# Patient Record
Sex: MALE | Race: AMERICAN INDIAN OR ALASKA NATIVE
[De-identification: names, ages, dates, MRNs, and addresses within clinical notes are randomized per-mention and may not be internally consistent; named-entity substitution may affect disease eponyms.]

---

## 2022-08-29 ENCOUNTER — HOSPITAL ENCOUNTER (EMERGENCY)
Dept: HOSPITAL 5 - ED | Age: 27
Discharge: HOME | End: 2022-08-29
Payer: COMMERCIAL

## 2022-08-29 VITALS — DIASTOLIC BLOOD PRESSURE: 83 MMHG | SYSTOLIC BLOOD PRESSURE: 152 MMHG

## 2022-08-29 DIAGNOSIS — Z20.822: ICD-10-CM

## 2022-08-29 DIAGNOSIS — J45.909: Primary | ICD-10-CM

## 2022-08-29 PROCEDURE — 99282 EMERGENCY DEPT VISIT SF MDM: CPT

## 2022-08-29 NOTE — EMERGENCY DEPARTMENT REPORT
ED Shortness of Breath HPI





- General


Chief Complaint: Dyspnea/Respdistress


Stated Complaint: ASTHMA ATTACK LOWER BACK PAIN


Time Seen by Provider: 08/29/22 10:49


Source: patient


Mode of arrival: Ambulatory


Limitations: No Limitations





- History of Present Illness


Initial Comments: 


26-year-old male history of asthma presents to the emergency department with 

asthma exacerbation.  Patient reports has been having coughing, wheezing, body 

aches she is ongoing for the past 2 days.  Also reports that his infant tested 

positive for COVID as well.  He is out of his albuterol.  Symptoms associated 

with shortness of breath, he denies headache, no vision changes, no nausea or 

vomiting, no fever, no swelling of his extremities,








- Related Data


Home Oxygen Therapy: No


                                  Previous Rx's











 Medication  Instructions  Recorded  Last Taken  Type


 


Albuterol Sulfate [Albuterol 0.63% 0.63 mg IH TID PRN #30 vial 08/29/22 Unknown 

Rx





NEBS]    


 


Albuterol Sulfate [Proair 90 mcg IH Q6H PRN #1 08/29/22 Unknown Rx





Digihaler]    


 


Ibuprofen [Motrin 800 MG tab] 800 mg PO TID PRN #20 tablet 08/29/22 Unknown Rx


 


Nebulizer and Compressor [Sims 1 each MC BID #1 each 08/29/22 Unknown Rx





Choice Nebulizer]    


 


predniSONE [Deltasone] 20 mg PO QDAY 5 Days #10 tab 08/29/22 Unknown Rx











                                    Allergies











Allergy/AdvReac Type Severity Reaction Status Date / Time


 


No Known Allergies Allergy   Verified 08/29/22 10:41














ED Review of Systems


ROS: 


Stated complaint: ASTHMA ATTACK LOWER BACK PAIN


Other details as noted in HPI





Constitutional: chills.  denies: fever


Eyes: as per HPI


ENT: denies: throat pain


Respiratory: cough, wheezing.  denies: orthopnea, shortness of breath


Cardiovascular: denies: chest pain, palpitations


Endocrine: denies: excessive sweating, flushing


Gastrointestinal: denies: abdominal pain, nausea, vomiting


Genitourinary: denies: urgency, dysuria, frequency


Musculoskeletal: denies: back pain


Skin: denies: rash


Neurological: denies: headache, weakness, numbness





ED Past Medical Hx





- Past Medical History


Previous Medical History?: Yes





- Medications


Home Medications: 


                                Home Medications











 Medication  Instructions  Recorded  Confirmed  Last Taken  Type


 


Albuterol Sulfate [Albuterol 0.63% 0.63 mg IH TID PRN #30 vial 08/29/22  Unknown

 Rx





NEBS]     


 


Albuterol Sulfate [Proair 90 mcg IH Q6H PRN #1 08/29/22  Unknown Rx





Digihaler]     


 


Ibuprofen [Motrin 800 MG tab] 800 mg PO TID PRN #20 tablet 08/29/22  Unknown Rx


 


Nebulizer and Compressor [Sims 1 each MC BID #1 each 08/29/22  Unknown Rx





Choice Nebulizer]     


 


predniSONE [Deltasone] 20 mg PO QDAY 5 Days #10 tab 08/29/22  Unknown Rx














ED Physical Exam





- General


Limitations: No Limitations


General appearance: alert, in no apparent distress





- Head


Head exam: Present: atraumatic





- Eye


Eye exam: Present: normal appearance





- ENT


ENT exam: Present: normal exam, normal orophraynx





- Neck


Neck exam: Present: normal inspection.  Absent: tenderness





- Respiratory


Respiratory exam: Present: normal lung sounds bilaterally.  Absent: respiratory 

distress, wheezes, chest wall tenderness





- Cardiovascular


Cardiovascular Exam: Present: regular rate, normal rhythm





- GI/Abdominal


GI/Abdominal exam: Present: soft.  Absent: distended, tenderness





- Extremities Exam


Extremities exam: Present: normal inspection, full ROM, normal capillary refill.

  Absent: tenderness





- Back Exam


Back exam: Present: normal inspection, full ROM.  Absent: CVA tenderness (R), 

CVA tenderness (L)





- Neurological Exam


Neurological exam: Present: alert, oriented X3





- Psychiatric


Psychiatric exam: Present: normal affect, normal mood





ED Course





                                   Vital Signs











  08/29/22





  10:36


 


Temperature 99.8 F H


 


Pulse Rate 73


 


Blood Pressure 152/83





[Right] 


 


O2 Sat by Pulse 97





Oximetry 














ED Medical Decision Making





- Medical Decision Making


26-year-old male history of asthma presenting with cough shortness of breath 

wheezing.  Also been in close contact with child who also has COVID at home.  

Patient is afebrile he is nontoxic-appearing his sats above 97% on room air, no 

use of  muscles.  Discharge him home with supportive therapy including 

albuterol, steroid pack, outpatient referral for COVID testing, supportive 

therapy mask wearing isolation ibuprofen as needed for the aches pain and fever,








Patient remained stable nontoxic-appearing, afebrile, ambulating steadily 

without assistance.  Gone over ED findings with patient as well as plan for 

follow-up.  Also discussed return precautions with patient, all questions and 

concerns addressed.  Patient is stable to be discharged follow-up outpatient.


Audio voice dictation device used, hence the chart might contain some dictation 

errors, mispronunciations, wrong spelling and wrong verbiage.





Critical care attestation.: 


If time is entered above; I have spent that time in minutes in the direct care 

of this critically ill patient, excluding procedure time.








ED Disposition


Clinical Impression: 


 Asthma exacerbation, Exposure to COVID-19 virus





Disposition: 01 HOME / SELF CARE / HOMELESS


Is pt being admited?: No


Does the pt Need Aspirin: No


Condition: Stable


Instructions:  Asthma, Adult


Prescriptions: 


Albuterol Sulfate [Albuterol 0.63% NEBS] 0.63 mg IH TID PRN #30 vial


 PRN Reason: Wheezing


Ibuprofen [Motrin 800 MG tab] 800 mg PO TID PRN #20 tablet


 PRN Reason: Pain , Severe (7-10)


Albuterol Sulfate [Proair Digihaler] 90 mcg IH Q6H PRN #1


 PRN Reason: Bronchospasm


Forms:  Work/School Release Form(ED)